# Patient Record
Sex: FEMALE | NOT HISPANIC OR LATINO | Employment: FULL TIME | URBAN - METROPOLITAN AREA
[De-identification: names, ages, dates, MRNs, and addresses within clinical notes are randomized per-mention and may not be internally consistent; named-entity substitution may affect disease eponyms.]

---

## 2019-11-23 ENCOUNTER — OFFICE VISIT (OUTPATIENT)
Dept: URGENT CARE | Facility: URGENT CARE | Age: 29
End: 2019-11-23

## 2019-11-23 VITALS
DIASTOLIC BLOOD PRESSURE: 80 MMHG | TEMPERATURE: 98.5 F | SYSTOLIC BLOOD PRESSURE: 126 MMHG | OXYGEN SATURATION: 99 % | HEART RATE: 73 BPM | WEIGHT: 171.5 LBS

## 2019-11-23 DIAGNOSIS — S43.101D SEPARATION OF RIGHT ACROMIOCLAVICULAR JOINT, SUBSEQUENT ENCOUNTER: Primary | ICD-10-CM

## 2019-11-23 PROCEDURE — 99203 OFFICE O/P NEW LOW 30 MIN: CPT | Performed by: FAMILY MEDICINE
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
## 2019-11-23 NOTE — LETTER
November 23, 2019      Juana Gilbert  [address] [place] [address]        To Whom It May Concern:          Juana Gilbert was seen in our clinic. Kindly excuse her absence until Tuesday (11/26/2019).          Sincerely,          Prasad Maldonado MD

## 2019-12-20 ENCOUNTER — OFFICE VISIT (OUTPATIENT)
Dept: URGENT CARE | Facility: URGENT CARE | Age: 29
End: 2019-12-20

## 2019-12-20 VITALS
SYSTOLIC BLOOD PRESSURE: 121 MMHG | WEIGHT: 167 LBS | DIASTOLIC BLOOD PRESSURE: 81 MMHG | RESPIRATION RATE: 12 BRPM | HEART RATE: 97 BPM | TEMPERATURE: 98.9 F | OXYGEN SATURATION: 98 %
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
## 2019-12-20 DIAGNOSIS — M25.511 ACUTE PAIN OF RIGHT SHOULDER: ICD-10-CM

## 2019-12-20 PROCEDURE — 99203 OFFICE O/P NEW LOW 30 MIN: CPT

## 2019-12-20 NOTE — LETTER
December 20, 2019      To whom it may concern,      Please be advised that Asya Gilbert was seen and examined at the clinic today.  She may return to work on 12/27/2019 with the following work restrictions:  -- no lifting, pushing, pulling, reaching above shoulder level with right arm  -- no standing for longer than 1 hour, may rest for 15 minutes before going back to standing again  -- no reaching for objects at floor level  -- no squatting    She has been referred to Orthopedics for further evaluation/management.  Returning to work without restrictions will be determined by Orthopedic specialist after consultation.        Campbell Diamond MD

## 2019-12-21 NOTE — PATIENT INSTRUCTIONS
Proceed with consultation with Orthopedics.    Follow up if your pain persist/worsens, or if you develop new symptoms.    Do not return to work if you still have diarrhea.

## 2020-01-02 ASSESSMENT — ENCOUNTER SYMPTOMS
WEAKNESS: 0
ABDOMINAL PAIN: 0
DIZZINESS: 0
DIARRHEA: 1
VOMITING: 0
JOINT SWELLING: 0
FEVER: 0
NECK PAIN: 0
NAUSEA: 0
NUMBNESS: 0
CHILLS: 0
SHORTNESS OF BREATH: 0
BACK PAIN: 1
HEADACHES: 0
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
## 2020-01-17 ENCOUNTER — ANCILLARY PROCEDURE (OUTPATIENT)
Dept: GENERAL RADIOLOGY | Facility: CLINIC | Age: 30
End: 2020-01-17
Attending: INTERNAL MEDICINE

## 2020-01-17 ENCOUNTER — OFFICE VISIT (OUTPATIENT)
Dept: FAMILY MEDICINE | Facility: CLINIC | Age: 30
End: 2020-01-17

## 2020-01-17 VITALS
SYSTOLIC BLOOD PRESSURE: 133 MMHG | WEIGHT: 172 LBS | BODY MASS INDEX: 27.64 KG/M2 | HEIGHT: 66 IN | HEART RATE: 88 BPM | TEMPERATURE: 99 F | DIASTOLIC BLOOD PRESSURE: 82 MMHG

## 2020-01-17 DIAGNOSIS — S49.91XD INJURY OF RIGHT SHOULDER, SUBSEQUENT ENCOUNTER: ICD-10-CM

## 2020-01-17 DIAGNOSIS — S49.91XD INJURY OF RIGHT SHOULDER, SUBSEQUENT ENCOUNTER: Primary | ICD-10-CM

## 2020-01-17 PROCEDURE — 99203 OFFICE O/P NEW LOW 30 MIN: CPT | Performed by: INTERNAL MEDICINE

## 2020-01-17 PROCEDURE — 73030 X-RAY EXAM OF SHOULDER: CPT | Mod: RT

## 2020-01-17 ASSESSMENT — MIFFLIN-ST. JEOR: SCORE: 1531.94

## 2020-01-17 NOTE — LETTER
January 17, 2020      To whom it may concern,      Please excuse Asya Gilbert from work on January 12, 2020 (Sunday) due to symptomatic aggravation of her injury.    Please also be informed that she has been referred to [facility] who will continue/take over further evaluation and management of her shoulder injury.  At this point, it is not possible to predict a fxodfw-sp-jefv-without-restriction date as this will depend on the evaluation and plan of the orthopedic specialist.        Campbell Diamond MD on 1/17/2020 at 6:34 PM
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
## 2020-01-30 ASSESSMENT — ENCOUNTER SYMPTOMS
NUMBNESS: 0
WEAKNESS: 0
NECK PAIN: 0